# Patient Record
Sex: FEMALE | Race: BLACK OR AFRICAN AMERICAN
[De-identification: names, ages, dates, MRNs, and addresses within clinical notes are randomized per-mention and may not be internally consistent; named-entity substitution may affect disease eponyms.]

---

## 2017-01-13 ENCOUNTER — HOSPITAL ENCOUNTER (EMERGENCY)
Dept: HOSPITAL 62 - ER | Age: 37
Discharge: HOME | End: 2017-01-13
Payer: COMMERCIAL

## 2017-01-13 VITALS — DIASTOLIC BLOOD PRESSURE: 71 MMHG | SYSTOLIC BLOOD PRESSURE: 109 MMHG

## 2017-01-13 DIAGNOSIS — R10.84: ICD-10-CM

## 2017-01-13 DIAGNOSIS — N39.0: Primary | ICD-10-CM

## 2017-01-13 DIAGNOSIS — R10.817: ICD-10-CM

## 2017-01-13 DIAGNOSIS — Z87.19: ICD-10-CM

## 2017-01-13 DIAGNOSIS — F17.210: ICD-10-CM

## 2017-01-13 LAB
ALBUMIN SERPL-MCNC: 4 G/DL (ref 3.5–5)
ALP SERPL-CCNC: 53 U/L (ref 38–126)
ALT SERPL-CCNC: 37 U/L (ref 9–52)
ANION GAP SERPL CALC-SCNC: 13 MMOL/L (ref 5–19)
APPEARANCE UR: CLEAR
AST SERPL-CCNC: 35 U/L (ref 14–36)
BASOPHILS # BLD AUTO: 0.1 10^3/UL (ref 0–0.2)
BASOPHILS NFR BLD AUTO: 0.7 % (ref 0–2)
BILIRUB DIRECT SERPL-MCNC: 0 MG/DL (ref 0–0.3)
BILIRUB SERPL-MCNC: 0.4 MG/DL (ref 0.2–1.3)
BILIRUB UR QL STRIP: NEGATIVE
BUN SERPL-MCNC: 18 MG/DL (ref 7–20)
CALCIUM: 9.2 MG/DL (ref 8.4–10.2)
CHLORIDE SERPL-SCNC: 105 MMOL/L (ref 98–107)
CO2 SERPL-SCNC: 22 MMOL/L (ref 22–30)
CREAT SERPL-MCNC: 0.9 MG/DL (ref 0.52–1.25)
EOSINOPHIL # BLD AUTO: 0.2 10^3/UL (ref 0–0.6)
EOSINOPHIL NFR BLD AUTO: 3.2 % (ref 0–6)
ERYTHROCYTE [DISTWIDTH] IN BLOOD BY AUTOMATED COUNT: 12.7 % (ref 11.5–14)
GLUCOSE SERPL-MCNC: 81 MG/DL (ref 75–110)
GLUCOSE UR STRIP-MCNC: NEGATIVE MG/DL
HCT VFR BLD CALC: 39.4 % (ref 36–47)
HGB BLD-MCNC: 12.7 G/DL (ref 12–15.5)
HGB HCT DIFFERENCE: -1.3
KETONES UR STRIP-MCNC: NEGATIVE MG/DL
LIPASE SERPL-CCNC: 207.1 U/L (ref 23–300)
LYMPHOCYTES # BLD AUTO: 2 10^3/UL (ref 0.5–4.7)
LYMPHOCYTES NFR BLD AUTO: 28.1 % (ref 13–45)
MCH RBC QN AUTO: 26.8 PG (ref 27–33.4)
MCHC RBC AUTO-ENTMCNC: 32.1 G/DL (ref 32–36)
MCV RBC AUTO: 84 FL (ref 80–97)
MONOCYTES # BLD AUTO: 0.8 10^3/UL (ref 0.1–1.4)
MONOCYTES NFR BLD AUTO: 11.4 % (ref 3–13)
NEUTROPHILS # BLD AUTO: 4 10^3/UL (ref 1.7–8.2)
NEUTS SEG NFR BLD AUTO: 56.6 % (ref 42–78)
NITRITE UR QL STRIP: POSITIVE
PH UR STRIP: 5 [PH] (ref 5–9)
POTASSIUM SERPL-SCNC: 5 MMOL/L (ref 3.6–5)
PROT SERPL-MCNC: 6.7 G/DL (ref 6.3–8.2)
PROT UR STRIP-MCNC: NEGATIVE MG/DL
RBC # BLD AUTO: 4.71 10^6/UL (ref 3.72–5.28)
SODIUM SERPL-SCNC: 140.4 MMOL/L (ref 137–145)
SP GR UR STRIP: 1.02
UROBILINOGEN UR-MCNC: NEGATIVE MG/DL (ref ?–2)
WBC # BLD AUTO: 7 10^3/UL (ref 4–10.5)

## 2017-01-13 PROCEDURE — 36415 COLL VENOUS BLD VENIPUNCTURE: CPT

## 2017-01-13 PROCEDURE — 85025 COMPLETE CBC W/AUTO DIFF WBC: CPT

## 2017-01-13 PROCEDURE — 87186 SC STD MICRODIL/AGAR DIL: CPT

## 2017-01-13 PROCEDURE — 84703 CHORIONIC GONADOTROPIN ASSAY: CPT

## 2017-01-13 PROCEDURE — 81001 URINALYSIS AUTO W/SCOPE: CPT

## 2017-01-13 PROCEDURE — 80053 COMPREHEN METABOLIC PANEL: CPT

## 2017-01-13 PROCEDURE — 87086 URINE CULTURE/COLONY COUNT: CPT

## 2017-01-13 PROCEDURE — 83690 ASSAY OF LIPASE: CPT

## 2017-01-13 PROCEDURE — 87088 URINE BACTERIA CULTURE: CPT

## 2017-01-13 PROCEDURE — 99284 EMERGENCY DEPT VISIT MOD MDM: CPT

## 2017-01-13 NOTE — ER DOCUMENT REPORT
ED Medical Screen (RME)





- General


Stated Complaint: STOMACH AND RIGHT BACK PAIN


Mode of Arrival: Ambulatory


Information source: Patient


Notes: 


pt presents to the ED with c/o severe epigastric RUQ pain that started this am. 

Reports nagging pain yesterday right side.  Denies f/n/v/d. 








I greeted and performed a rapid initial assessment of this patient. 

Comprehensive ED assessment and evaluation of the patient, analysis of test 

results and completion of the medical decision making process will be conducted 

by additional ED providers.


TRAVEL OUTSIDE OF THE [place] IN LAST 30 DAYS: No





- Related Data


Allergies/Adverse Reactions: 


 





No Known Allergies Allergy (Verified 07/12/15 10:22)


 











Past Medical History


Past Surgical History: Reports: Hx Gynecologic Surgery - laprascopy





- Immunizations


Hx Diphtheria, Pertussis, Tetanus Vaccination: Yes





Physical Exam





- Vital signs


Vitals: 





 











Temp Pulse Resp BP Pulse Ox


 


 98.1 F   90   17   109/71   100 


 


 01/13/17 18:24  01/13/17 18:24  01/13/17 18:24  01/13/17 18:24  01/13/17 18:24














Course





- Vital Signs


Vital signs: 





 











Temp Pulse Resp BP Pulse Ox


 


 98.1 F   90   17   109/71   100 


 


 01/13/17 18:24  01/13/17 18:24  01/13/17 18:24  01/13/17 18:24  01/13/17 18:24

## 2017-01-13 NOTE — ER DOCUMENT REPORT
HPI





- HPI


Patient complains to provider of: abdominal pain


Onset: This morning


Onset/Duration: Sudden, Persistent


Quality of pain: Achy


Severity: Severe


Pain Level: 4


Associated Symptoms: None


Exacerbated by: Denies


Relieved by: Denies


Similar symptoms previously: No


Recently seen / treated by doctor: No





- REPRODUCTIVE


Reproductive: DENIES: Pregnant:





- DERM


Skin Color: Normal





Past Medical History





- General


Information source: Patient





- Social History


Smoking Status: Current Every Day Smoker


Cigarette use (# per day): Yes


Chew tobacco use (# tins/day): No


Frequency of alcohol use: None


Drug Abuse: None


Family History: Reviewed & Not Pertinent


Patient has suicidal ideation: No


Patient has homicidal ideation: No


Neurological Medical History: Reports: Other - trigeminal neuralgia


Renal/ Medical History: Denies: Hx Peritoneal Dialysis


GI Medical History: Reports: Hx Gastroesophageal Reflux Disease


Psychiatric Medical History: Reports: Hx Anxiety


Past Surgical History: Reports: Hx Dilation and Curettage, Hx Gynecologic 

Surgery - laprascopy





- Immunizations


Hx Diphtheria, Pertussis, Tetanus Vaccination: Yes





Vertical Provider Document





- CONSTITUTIONAL


Agree With Documented VS: Yes


Exam Limitations: No Limitations


General Appearance: WD/WN, Mild Distress - looks like she doesn't feel well, 

nontoxic looking





- INFECTION CONTROL


TRAVEL OUTSIDE OF THE [place] IN LAST 30 DAYS: No





- HEENT


HEENT: Atraumatic, Normocephalic





- NECK


Neck: Normal Inspection, Supple





- RESPIRATORY


Respiratory: Breath Sounds Normal, No Respiratory Distress


O2 Sat by Pulse Oximetry: 100





- CARDIOVASCULAR


Cardiovascular: Regular Rate





- GI/ABDOMEN


Gastrointestinal: Abdomen Soft, Abdomen Tender - generalized.  negative: 

Abdominal Guarding, Abdominal Rebound





- MUSCULOSKELETAL/EXTREMETIES


Musculoskeletal/Extremeties: MAEW, FROM





- NEURO


Level of Consciousness: Awake, Alert, Appropriate


Motor/Sensory: No Motor Deficit





- DERM


Integumentary: Warm, Dry





Course





- Re-evaluation


Re-evalutation: 





01/13/17 20:21


pt instructed on UTI, macrobid, s/s allergic reaction and importance of fu with 

pcp for recheck





- Vital Signs


Vital signs: 


 











Temp Pulse Resp BP Pulse Ox


 


 98.1 F   90   17   109/71   100 


 


 01/13/17 18:24  01/13/17 18:24  01/13/17 18:24  01/13/17 18:24  01/13/17 18:24














- Laboratory


Result Diagrams: 


 01/13/17 18:35





 01/13/17 18:35


Laboratory results interpreted by me: 


 











  01/13/17 01/13/17





  18:35 18:35


 


[facility]  26.8 L 


 


Urine Nitrite   POSITIVE H


 


Urine Ascorbic Acid   20 H














Discharge





- Discharge


Clinical Impression: 


Abdominal pain


Qualifiers:


 Abdominal location: generalized Qualified Code(s): R10.84 - Generalized 

abdominal pain





UTI (urinary tract infection)


Qualifiers:


 Urinary tract infection type: site unspecified Hematuria presence: without 

hematuria Qualified Code(s): N39.0 - Urinary tract infection, site not specified





Condition: Stable


Disposition: HOME, SELF-CARE


Instructions:  Nitrofurantoin ([facility]), Oral Narcotic Medication ([facility]), Urinary 

Tract Infection ([facility]), Abdominal Pain ([facility])


Additional Instructions: 


*You have been evaluated for abdominal pain, UTI


*Take medication as prescribed


*Push fluids


*Follow up with your primary care provider within one week for recheck


*Plan urine recheck in one week


*Return to ED for worsening condition, changes, needs


Prescriptions: 


Nitrofurantoin/Nitrofuran Mac [Macrobid 100 mg Capsule] 100 mg PO BID #20 

capsule


Forms:  Return to Work

## 2018-01-06 ENCOUNTER — HOSPITAL ENCOUNTER (EMERGENCY)
Dept: HOSPITAL 62 - ER | Age: 38
Discharge: HOME | End: 2018-01-06
Payer: COMMERCIAL

## 2018-01-06 VITALS — DIASTOLIC BLOOD PRESSURE: 82 MMHG | SYSTOLIC BLOOD PRESSURE: 115 MMHG

## 2018-01-06 DIAGNOSIS — F17.210: ICD-10-CM

## 2018-01-06 DIAGNOSIS — G50.0: ICD-10-CM

## 2018-01-06 DIAGNOSIS — Z79.899: ICD-10-CM

## 2018-01-06 DIAGNOSIS — M54.2: ICD-10-CM

## 2018-01-06 DIAGNOSIS — S46.811A: ICD-10-CM

## 2018-01-06 DIAGNOSIS — S29.012A: ICD-10-CM

## 2018-01-06 DIAGNOSIS — M25.511: ICD-10-CM

## 2018-01-06 DIAGNOSIS — S29.011A: Primary | ICD-10-CM
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
## 2018-01-06 DIAGNOSIS — R07.9: ICD-10-CM

## 2018-01-06 PROCEDURE — 93005 ELECTROCARDIOGRAM TRACING: CPT

## 2018-01-06 PROCEDURE — 93010 ELECTROCARDIOGRAM REPORT: CPT

## 2018-01-06 PROCEDURE — 99285 EMERGENCY DEPT VISIT HI MDM: CPT

## 2018-01-06 PROCEDURE — L0120 CERV FLEX N/ADJ FOAM PRE OTS: HCPCS

## 2018-01-06 NOTE — ER DOCUMENT REPORT
ED Neck/Back Problem





- General


Chief Complaint: Chest Pain > 30


Stated Complaint: CHEST PAIN


Time Seen by Provider: 01/06/18 09:25


Information source: Patient, [facility] Records, Outside Facility Records


Notes: 





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


She reports onset this past Thursday of a 3 day history of pain in her right 

anterior chest, right neck shoulder region, and right scapular back.  She does 

not know a history of any particular strain or stretch or injury.


She does note the pain is worse when she uses her extremity, or when she lays 

on her left side.  Laying on the right side tends to protect it.  Sitting up 

and having her elbow on arm of the chair providing support helps the discomfort.


She does have Baclofen to take as a muscle relaxer, she takes that for her 

trigeminal neuralgia pain.


TRAVEL OUTSIDE OF THE [place] IN LAST 30 DAYS: No





- Related Data


Allergies/Adverse Reactions: 


 





No Known Allergies Allergy (Verified 01/13/17 20:24)


 











Past Medical History





- General


Information source: Patient, [facility] Records, Outside Facility Records





- Social History


Smoking Status: Current Every Day Smoker


Cigarette use (# per day): Yes


Chew tobacco use (# tins/day): No


Smoking Education Provided: No


Frequency of alcohol use: None


Drug Abuse: None


Occupation: [employer] at [employer]


Lives with: Family


Family History: Reviewed & Not Pertinent


Patient has suicidal ideation: No


Patient has homicidal ideation: No





- Past Medical History


Cardiac Medical History: Reports: None


Pulmonary Medical History: Reports: None


EENT Medical History: Reports: None


Neurological Medical History: Reports: Other - Trigeminal neuralgia


Endocrine Medical History: Reports: None


Renal/ Medical History: Reports: None


GI Medical History: Reports: Hx Gastroesophageal Reflux Disease


Musculoskeltal Medical History: Reports None


Skin Medical History: Reports None


Psychiatric Medical History: Reports: Hx Anxiety


Past Surgical History: Reports: Hx Dilation and Curettage, Hx Gynecologic 

Surgery - laprascopy, D+C





- Immunizations


Hx Diphtheria, Pertussis, Tetanus Vaccination: Yes





Review of Systems





- Review of Systems


Constitutional: No symptoms reported


EENT: No symptoms reported


Cardiovascular: No symptoms reported


Respiratory: See HPI


Gastrointestinal: No symptoms reported


Genitourinary: No symptoms reported


Female Genitourinary: No symptoms reported


Musculoskeletal: See HPI


Skin: No symptoms reported


Hematologic/Lymphatic: No symptoms reported


Neurological/Psychological: See HPI





Physical Exam





- Vital signs


Vitals: 


 











Temp Pulse Resp BP Pulse Ox


 


 98.0 F   75   12   115/82   100 


 


 01/06/18 09:23  01/06/18 09:23  01/06/18 09:23  01/06/18 09:23  01/06/18 09:23











Interpretation: Normal





- General


General appearance: Appears well, Alert


In distress: None





- HEENT


Head: Normocephalic, Atraumatic


Eyes: Normal


Pupils: PERRL


Neck: Supple, Other - Supporting the weight of the patient's head by lifting up 

under the chin and the base of the skull and allowing her to drop her shoulder 

muscles and totally relax feels much better.  When I let go of the support she 

suddenly feels discomfort and wants to tense up her muscles again.


Notes: 





Very tender to palpate the right posterior cervical muscles and right trapezius 

muscles going out toward the shoulder and over the scapular region.


The left posterior cervical muscles and trapezius muscles are not tender to 

palpate.





- Respiratory


Respiratory status: No respiratory distress


Breath sounds: Normal


Chest palpation: Tender - Tenderness to palpate the right anterior chest 

muscles above the breast and underneath the upper breast tissue.





- Cardiovascular


Rhythm: Regular


Heart sounds: Normal auscultation


Murmur: No





- Abdominal


Inspection: Normal





- Back


Back: Tender - Tenderness to palpate the right medial scapular muscles and 

levator scapula muscles





- Extremities


General upper extremity: Other - The right trapezius muscles and right anterior 

and posterior shoulder muscles are somewhat tender to palpate.  The entire area 

feels better if I support the weight of the upper extremity and allow her to 

let the muscles relax.


General lower extremity: Normal inspection





- Neurological


Neuro grossly intact: Yes





- Psychological


Associated symptoms: Normal affect, Normal mood





- Skin


Skin Temperature: Warm


Skin Moisture: Dry


Skin Color: Normal





Course





- Vital Signs


Vital signs: 


 











Temp Pulse Resp BP Pulse Ox


 


 98.0 F   75   12   115/82   100 


 


 01/06/18 09:23  01/06/18 09:23  01/06/18 09:23  01/06/18 09:23  01/06/18 09:23














- EKG Interpretation by Me


EKG shows normal: Sinus rhythm, Axis, Intervals, QRS Complexes, ST-T Waves


Rate: Normal - 74


Rhythm: NSR





Discharge





- Discharge


Clinical Impression: 


 Muscle strain of anterior chest wall





Strain of right trapezius muscle


Qualifiers:


 Encounter type: initial encounter Qualified Code(s): S46.811A - Strain of 

other muscles, fascia and tendons at shoulder and upper arm level, right arm, 

initial encounter





Strain of right levator scapulae muscle


Qualifiers:


 Encounter type: initial encounter Qualified Code(s): S46.811A - Strain of 

other muscles, fascia and tendons at shoulder and upper arm level, right arm, 

initial encounter





Disposition: HOME, SELF-CARE


Additional Instructions: 


Muscle Strain:





     You have strained the muscles in your right anterior chest wall, your 

right neck and trapezius muscle region, and your right scapular back. This 

often occurs with strenuous exertion, or during an injury that suddenly 

stretches the muscle.  The seriousness of a strain varies. Some strains heal 

within days, others cause problems for months.


     X-rays cannot show a muscle strain.  X-rays are taken only if symptoms 

suggest that a fracture could be present.


     The usual treatment of a muscle strain is rest and ice packs. Sometimes, a 

sling, splint, or crutches may be necessary to rest the muscle.  The muscle can 

be used again once pain subsides.  Severe strains require a special exercise 

and stretching program to prevent permanent stiffness and disability.  Your 

doctor will advise you if this will be necessary.


     Call the doctor immediately if pain or swelling becomes severe, or if 

numbness or discoloration develop.








////////////////////////////////////////////////////////////////////////////////

////////////////////////////////////////////////////////////////////////////////

///////////////////





Use the sling to support the weight of your arm and shoulder, use soft collar 

to support the weight of your head.  This should allow you to let the muscles 

relax.


Continue taking your baclofen as a muscle relaxer.


Take the pain medication as prescribed for pain and increased spasm if needed.


Take Motrin 800 mg every 8 hours or 2 Aleve twice daily for the anti-

inflammatory and pain relief benefit.


Use moist heat to the painful muscles.


Rest the right arm and shoulder is much as possible.


Follow-up with your doctor if not improving over the next several days.





RETURN TO THE EMERGENCY ROOM IF ANY NEW OR WORSENING SYMPTOMS.








Prescriptions: 


Oxycodone HCl/Acetaminophen [Percocet 5-325 mg Tablet] 1 tab PO ASDIR PRN #15 

tablet


 PRN Reason: 


Forms:  Return to Work

## 2018-11-10 ENCOUNTER — HOSPITAL ENCOUNTER (EMERGENCY)
Dept: HOSPITAL 62 - ER | Age: 38
LOS: 1 days | Discharge: HOME | End: 2018-11-11
Payer: COMMERCIAL

## 2018-11-10 DIAGNOSIS — M54.5: ICD-10-CM

## 2018-11-10 DIAGNOSIS — M53.3: Primary | ICD-10-CM
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
## 2018-11-10 PROCEDURE — 72220 X-RAY EXAM SACRUM TAILBONE: CPT

## 2018-11-10 PROCEDURE — 99283 EMERGENCY DEPT VISIT LOW MDM: CPT

## 2018-11-11 VITALS — SYSTOLIC BLOOD PRESSURE: 96 MMHG | DIASTOLIC BLOOD PRESSURE: 53 MMHG
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
